# Patient Record
Sex: FEMALE | Race: ASIAN
[De-identification: names, ages, dates, MRNs, and addresses within clinical notes are randomized per-mention and may not be internally consistent; named-entity substitution may affect disease eponyms.]

---

## 2017-06-21 ENCOUNTER — HOSPITAL ENCOUNTER (OUTPATIENT)
Dept: HOSPITAL 45 - C.PAPS | Age: 62
Discharge: HOME | End: 2017-06-21
Attending: OBSTETRICS & GYNECOLOGY
Payer: COMMERCIAL

## 2017-06-21 DIAGNOSIS — Z01.419: Primary | ICD-10-CM

## 2017-08-11 ENCOUNTER — HOSPITAL ENCOUNTER (OUTPATIENT)
Dept: HOSPITAL 45 - C.MAMM | Age: 62
Discharge: HOME | End: 2017-08-11
Attending: OBSTETRICS & GYNECOLOGY
Payer: COMMERCIAL

## 2017-08-11 DIAGNOSIS — Z12.31: Primary | ICD-10-CM

## 2017-08-14 NOTE — MAMMOGRAPHY REPORT
BILATERAL DIGITAL SCREENING MAMMOGRAM TOMOSYNTHESIS WITH CAD: 8/11/2017



TECHNIQUE:  Breast tomosynthesis in addition to standard 2D mammography was performed. Current study 
was also evaluated with a Computer Aided Detection (CAD) system.  



COMPARISON: Comparison is made to exams dated:  8/5/2016 mammogram, 7/16/2015 mammogram, 7/15/2014 ma
mmogram, 6/17/2013 mammogram, 6/14/2012 mammogram, and 6/10/2011 mammogram - [facility]
nter.   



BREAST COMPOSITION:  There are scattered areas of fibroglandular density in both breasts.  



FINDINGS:  No suspicious masses, calcifications, or areas of architectural distortion are noted in ei
ther breast. There has been no significant interval change compared to prior exams.  



IMPRESSION:  ACR BI-RADS CATEGORY 1: NEGATIVE

There is no mammographic evidence of malignancy. A 1 year screening mammogram is recommended.  The pa
tient will receive written notification of the results.  





Approximately 10% of breast cancers are not detected with mammography. A negative mammographic report
 should not delay biopsy if a clinically suggestive mass is present.



Dana Lee M.D.          

/:8/11/2017 15:34:35  



Imaging Technologist: Danita CASTILLO)([place]), [facility]

letter sent: Normal 1/2  

BI-RADS Code: ACR BI-RADS Category 1: Negative

## 2017-10-04 ENCOUNTER — HOSPITAL ENCOUNTER (OUTPATIENT)
Dept: HOSPITAL 45 - C.LABSPEC | Age: 62
Discharge: HOME | End: 2017-10-04
Attending: OBSTETRICS & GYNECOLOGY
Payer: COMMERCIAL

## 2017-10-04 DIAGNOSIS — N93.9: Primary | ICD-10-CM

## 2017-11-06 ENCOUNTER — HOSPITAL ENCOUNTER (OUTPATIENT)
Dept: HOSPITAL 45 - C.ULTR | Age: 62
Discharge: HOME | End: 2017-11-06
Attending: FAMILY MEDICINE
Payer: COMMERCIAL

## 2017-11-06 DIAGNOSIS — R10.9: Primary | ICD-10-CM

## 2017-11-06 NOTE — DIAGNOSTIC IMAGING REPORT
ABDOMEN COMPLETE (US)



CLINICAL HISTORY: 62 years-old Female presenting with ABDOMINAL PAIN. 



TECHNIQUE: Real-time grayscale and limited color Doppler ultrasound imaging of

the abdomen was performed. 



COMPARISON: None.



FINDINGS:



Pancreas: Visualized portions of the pancreatic head and body normal.



Liver: Normal echogenicity and echotexture. Main portal vein patent with normal

directional flow.



Biliary: No intrahepatic biliary ductal dilatation. Common bile duct measures up

to 5 mm in diameter.



Gallbladder: Normal in appearance without evidence of gallstones, gallbladder

wall thickening, or pericholecystic fluid.    



Spleen: Normal in echogenicity and size, measuring 7.0 cm in length.



Kidneys: Normal in size and echogenicity. Right kidney measures 9.7 cm, and left

kidney measures 9.5 cm. No hydronephrosis.



Vasculature: Visualized portions of the IVC and abdominal aorta normal.



Ascites: None.



IMPRESSION: 

Normal abdominal ultrasound.







Electronically signed by:  Altaf Becerra M.D.

11/6/2017 8:16 AM



Dictated Date/Time:  11/6/2017 8:13 AM

## 2018-02-28 ENCOUNTER — HOSPITAL ENCOUNTER (OUTPATIENT)
Dept: HOSPITAL 45 - C.ULTR | Age: 63
Discharge: HOME | End: 2018-02-28
Attending: FAMILY MEDICINE
Payer: COMMERCIAL

## 2018-02-28 DIAGNOSIS — R10.9: Primary | ICD-10-CM

## 2018-02-28 DIAGNOSIS — N83.201: ICD-10-CM

## 2018-02-28 NOTE — DIAGNOSTIC IMAGING REPORT
PELVIC COMPLETE NON OB



HISTORY:  62 years-old Female ABDOMINAL PAIN acute generalized abdominal pain.

History of prior 



COMPARISON: Pelvic ultrasound 2014



TECHNIQUE: Multiple real-time sonographic images of the deep pelvic structures

were obtained transabdominally and transvaginally assessing grayscale

appearance, color and spectral flow



FINDINGS: 



TRANSABDOMINAL:

 Retroflexed uterus measures 6.7 x 3.6 x 3.8 cm. Ovaries not visualized.



TRANSVAGINAL:

Retroflexed uterus measures 6.8 x 3.4 x 4.4 cm. Endometrium measures 0.3 cm,

within normal limits in a postmenopausal patient. No myometrial mass lesions

identified.



Right ovary measures 2.4 x 0.6 x 1.5 cm and demonstrates arterial inflow. There

is a small 7 mm cystic lesion of the right ovary is nonspecific. The left ovary

measures 1.1 x 0.7 x 1.6 cm and is unremarkable with arterial inflow documented.

No significant free pelvic fluid.



IMPRESSION: 

1. Unremarkable sonographic appearance of the uterus and endometrium. 

2. 7 mm cyst of the right ovary.

3. Normal left ovary.







The above report was generated using voice recognition software. It may contain

grammatical, syntax or spelling errors.







Electronically signed by:  Mando Vo M.D.

2018 11:49 AM



Dictated Date/Time:  2018 11:45 AM